# Patient Record
Sex: MALE | Race: BLACK OR AFRICAN AMERICAN | NOT HISPANIC OR LATINO | URBAN - METROPOLITAN AREA
[De-identification: names, ages, dates, MRNs, and addresses within clinical notes are randomized per-mention and may not be internally consistent; named-entity substitution may affect disease eponyms.]

---

## 2019-09-08 VITALS
RESPIRATION RATE: 18 BRPM | HEART RATE: 87 BPM | DIASTOLIC BLOOD PRESSURE: 101 MMHG | OXYGEN SATURATION: 94 % | SYSTOLIC BLOOD PRESSURE: 167 MMHG | WEIGHT: 210.98 LBS | TEMPERATURE: 99 F

## 2019-09-08 LAB
APTT BLD: 32.4 SEC — SIGNIFICANT CHANGE UP (ref 27.5–36.3)
BASOPHILS # BLD AUTO: 0.02 K/UL — SIGNIFICANT CHANGE UP (ref 0–0.2)
BASOPHILS NFR BLD AUTO: 0.3 % — SIGNIFICANT CHANGE UP (ref 0–2)
EOSINOPHIL # BLD AUTO: 0.09 K/UL — SIGNIFICANT CHANGE UP (ref 0–0.5)
EOSINOPHIL NFR BLD AUTO: 1.4 % — SIGNIFICANT CHANGE UP (ref 0–6)
HCT VFR BLD CALC: 25 % — LOW (ref 39–50)
HGB BLD-MCNC: 8 G/DL — LOW (ref 13–17)
IMM GRANULOCYTES NFR BLD AUTO: 0.3 % — SIGNIFICANT CHANGE UP (ref 0–1.5)
INR BLD: 1.46 — HIGH (ref 0.88–1.16)
LYMPHOCYTES # BLD AUTO: 0.62 K/UL — LOW (ref 1–3.3)
LYMPHOCYTES # BLD AUTO: 9.4 % — LOW (ref 13–44)
MCHC RBC-ENTMCNC: 29.2 PG — SIGNIFICANT CHANGE UP (ref 27–34)
MCHC RBC-ENTMCNC: 32 GM/DL — SIGNIFICANT CHANGE UP (ref 32–36)
MCV RBC AUTO: 91.2 FL — SIGNIFICANT CHANGE UP (ref 80–100)
MONOCYTES # BLD AUTO: 0.78 K/UL — SIGNIFICANT CHANGE UP (ref 0–0.9)
MONOCYTES NFR BLD AUTO: 11.8 % — SIGNIFICANT CHANGE UP (ref 2–14)
NEUTROPHILS # BLD AUTO: 5.1 K/UL — SIGNIFICANT CHANGE UP (ref 1.8–7.4)
NEUTROPHILS NFR BLD AUTO: 76.8 % — SIGNIFICANT CHANGE UP (ref 43–77)
NRBC # BLD: 0 /100 WBCS — SIGNIFICANT CHANGE UP (ref 0–0)
PLATELET # BLD AUTO: 219 K/UL — SIGNIFICANT CHANGE UP (ref 150–400)
PROTHROM AB SERPL-ACNC: 16.7 SEC — HIGH (ref 10–12.9)
RBC # BLD: 2.74 M/UL — LOW (ref 4.2–5.8)
RBC # FLD: 15.7 % — HIGH (ref 10.3–14.5)
WBC # BLD: 6.63 K/UL — SIGNIFICANT CHANGE UP (ref 3.8–10.5)
WBC # FLD AUTO: 6.63 K/UL — SIGNIFICANT CHANGE UP (ref 3.8–10.5)

## 2019-09-08 NOTE — ED ADULT NURSE NOTE - NSIMPLEMENTINTERV_GEN_ALL_ED
Implemented All Universal Safety Interventions:  [place] to call system. Call bell, personal items and telephone within reach. Instruct patient to call for assistance. Room bathroom lighting operational. Non-slip footwear when patient is off stretcher. Physically safe environment: no spills, clutter or unnecessary equipment. Stretcher in lowest position, wheels locked, appropriate side rails in place.

## 2019-09-08 NOTE — ED ADULT NURSE NOTE - CHPI ED NUR SYMPTOMS NEG
no nausea/no blood in stool/no chills/no hematuria/no dysuria/no fever/no diarrhea/no abdominal distension

## 2019-09-08 NOTE — ED ADULT TRIAGE NOTE - CHIEF COMPLAINT QUOTE
pt BIBA from home complaining of painful and swollen testicles since yesterday states hx of similar in the past that was related to "fluid overload" and he needed dialysis

## 2019-09-08 NOTE — ED ADULT NURSE NOTE - PMH
DM (diabetes mellitus)  type 2  HLD (hyperlipidemia)    HTN (hypertension)    Renal failure  R upper arm fistula/ HD M-W-D-F

## 2019-09-08 NOTE — ED ADULT NURSE NOTE - RESPIRATORY WDL
Breathing spontaneous and unlabored. Rales/rhonchi to lower lung lobes bilaterally with regular rhythm.

## 2019-09-08 NOTE — ED ADULT NURSE NOTE - MUSCULOSKELETAL WDL
Full range of motion of upper and lower extremities, no joint tenderness/swelling. use cane to ambulate

## 2019-09-08 NOTE — ED ADULT NURSE NOTE - OBJECTIVE STATEMENT
Pt alert and oriented X3. Pt coming from home due to testicular pain and swelling for the last day. Dialysis pt. M-T-F. last HD on Friday. Pt denies any chest pain, or SOB. nonpitting lower extremities edema.

## 2019-09-09 ENCOUNTER — INPATIENT (INPATIENT)
Facility: HOSPITAL | Age: 39
LOS: 1 days | Discharge: HOME | DRG: 682 | End: 2019-09-11
Attending: STUDENT IN AN ORGANIZED HEALTH CARE EDUCATION/TRAINING PROGRAM | Admitting: STUDENT IN AN ORGANIZED HEALTH CARE EDUCATION/TRAINING PROGRAM
Payer: COMMERCIAL

## 2019-09-09 DIAGNOSIS — E87.70 FLUID OVERLOAD, UNSPECIFIED: ICD-10-CM

## 2019-09-09 DIAGNOSIS — I10 ESSENTIAL (PRIMARY) HYPERTENSION: ICD-10-CM

## 2019-09-09 DIAGNOSIS — F20.9 SCHIZOPHRENIA, UNSPECIFIED: ICD-10-CM

## 2019-09-09 DIAGNOSIS — G92 TOXIC ENCEPHALOPATHY: ICD-10-CM

## 2019-09-09 DIAGNOSIS — Z91.89 OTHER SPECIFIED PERSONAL RISK FACTORS, NOT ELSEWHERE CLASSIFIED: ICD-10-CM

## 2019-09-09 DIAGNOSIS — N18.6 END STAGE RENAL DISEASE: ICD-10-CM

## 2019-09-09 DIAGNOSIS — E11.9 TYPE 2 DIABETES MELLITUS WITHOUT COMPLICATIONS: ICD-10-CM

## 2019-09-09 DIAGNOSIS — I63.9 CEREBRAL INFARCTION, UNSPECIFIED: ICD-10-CM

## 2019-09-09 DIAGNOSIS — I77.0 ARTERIOVENOUS FISTULA, ACQUIRED: Chronic | ICD-10-CM

## 2019-09-09 DIAGNOSIS — R63.8 OTHER SYMPTOMS AND SIGNS CONCERNING FOOD AND FLUID INTAKE: ICD-10-CM

## 2019-09-09 DIAGNOSIS — N50.89 OTHER SPECIFIED DISORDERS OF THE MALE GENITAL ORGANS: ICD-10-CM

## 2019-09-09 LAB
ALBUMIN SERPL ELPH-MCNC: 3.7 G/DL — SIGNIFICANT CHANGE UP (ref 3.3–5)
ALP SERPL-CCNC: 160 U/L — HIGH (ref 40–120)
ALT FLD-CCNC: 14 U/L — SIGNIFICANT CHANGE UP (ref 10–45)
ANION GAP SERPL CALC-SCNC: 15 MMOL/L — SIGNIFICANT CHANGE UP (ref 5–17)
ANION GAP SERPL CALC-SCNC: 17 MMOL/L — SIGNIFICANT CHANGE UP (ref 5–17)
APTT BLD: 29.9 SEC — SIGNIFICANT CHANGE UP (ref 27.5–36.3)
AST SERPL-CCNC: 16 U/L — SIGNIFICANT CHANGE UP (ref 10–40)
BILIRUB SERPL-MCNC: 0.8 MG/DL — SIGNIFICANT CHANGE UP (ref 0.2–1.2)
BUN SERPL-MCNC: 45 MG/DL — HIGH (ref 7–23)
BUN SERPL-MCNC: 48 MG/DL — HIGH (ref 7–23)
CALCIUM SERPL-MCNC: 8.7 MG/DL — SIGNIFICANT CHANGE UP (ref 8.4–10.5)
CALCIUM SERPL-MCNC: 8.8 MG/DL — SIGNIFICANT CHANGE UP (ref 8.4–10.5)
CHLORIDE SERPL-SCNC: 90 MMOL/L — LOW (ref 96–108)
CHLORIDE SERPL-SCNC: 91 MMOL/L — LOW (ref 96–108)
CO2 SERPL-SCNC: 26 MMOL/L — SIGNIFICANT CHANGE UP (ref 22–31)
CO2 SERPL-SCNC: 26 MMOL/L — SIGNIFICANT CHANGE UP (ref 22–31)
CREAT SERPL-MCNC: 5.87 MG/DL — HIGH (ref 0.5–1.3)
CREAT SERPL-MCNC: 6.16 MG/DL — HIGH (ref 0.5–1.3)
GLUCOSE BLDC GLUCOMTR-MCNC: 257 MG/DL — HIGH (ref 70–99)
GLUCOSE BLDC GLUCOMTR-MCNC: 276 MG/DL — HIGH (ref 70–99)
GLUCOSE BLDC GLUCOMTR-MCNC: 291 MG/DL — HIGH (ref 70–99)
GLUCOSE BLDC GLUCOMTR-MCNC: 312 MG/DL — HIGH (ref 70–99)
GLUCOSE BLDC GLUCOMTR-MCNC: 550 MG/DL — CRITICAL HIGH (ref 70–99)
GLUCOSE SERPL-MCNC: 355 MG/DL — HIGH (ref 70–99)
GLUCOSE SERPL-MCNC: 685 MG/DL — CRITICAL HIGH (ref 70–99)
HBA1C BLD-MCNC: 12.6 % — HIGH (ref 4–5.6)
HBV SURFACE AB SER-ACNC: SIGNIFICANT CHANGE UP
HCT VFR BLD CALC: 26.2 % — LOW (ref 39–50)
HCV AB S/CO SERPL IA: 0.05 S/CO — SIGNIFICANT CHANGE UP
HCV AB SERPL-IMP: SIGNIFICANT CHANGE UP
HGB BLD-MCNC: 8 G/DL — LOW (ref 13–17)
INR BLD: 1.38 — HIGH (ref 0.88–1.16)
MAGNESIUM SERPL-MCNC: 2.3 MG/DL — SIGNIFICANT CHANGE UP (ref 1.6–2.6)
MCHC RBC-ENTMCNC: 28.7 PG — SIGNIFICANT CHANGE UP (ref 27–34)
MCHC RBC-ENTMCNC: 30.5 GM/DL — LOW (ref 32–36)
MCV RBC AUTO: 93.9 FL — SIGNIFICANT CHANGE UP (ref 80–100)
NRBC # BLD: 0 /100 WBCS — SIGNIFICANT CHANGE UP (ref 0–0)
PHOSPHATE SERPL-MCNC: 5.1 MG/DL — HIGH (ref 2.5–4.5)
PHOSPHATE SERPL-MCNC: 6.3 MG/DL — HIGH (ref 2.5–4.5)
PLATELET # BLD AUTO: 222 K/UL — SIGNIFICANT CHANGE UP (ref 150–400)
POTASSIUM SERPL-MCNC: 4.1 MMOL/L — SIGNIFICANT CHANGE UP (ref 3.5–5.3)
POTASSIUM SERPL-MCNC: 4.8 MMOL/L — SIGNIFICANT CHANGE UP (ref 3.5–5.3)
POTASSIUM SERPL-SCNC: 4.1 MMOL/L — SIGNIFICANT CHANGE UP (ref 3.5–5.3)
POTASSIUM SERPL-SCNC: 4.8 MMOL/L — SIGNIFICANT CHANGE UP (ref 3.5–5.3)
PROT SERPL-MCNC: 6.8 G/DL — SIGNIFICANT CHANGE UP (ref 6–8.3)
PROTHROM AB SERPL-ACNC: 15.7 SEC — HIGH (ref 10–12.9)
RBC # BLD: 2.79 M/UL — LOW (ref 4.2–5.8)
RBC # FLD: 15.5 % — HIGH (ref 10.3–14.5)
SODIUM SERPL-SCNC: 131 MMOL/L — LOW (ref 135–145)
SODIUM SERPL-SCNC: 134 MMOL/L — LOW (ref 135–145)
TROPONIN T SERPL-MCNC: 0.39 NG/ML — CRITICAL HIGH (ref 0–0.01)
TROPONIN T SERPL-MCNC: 0.42 NG/ML — CRITICAL HIGH (ref 0–0.01)
TROPONIN T SERPL-MCNC: 0.43 NG/ML — CRITICAL HIGH (ref 0–0.01)
WBC # BLD: 6.49 K/UL — SIGNIFICANT CHANGE UP (ref 3.8–10.5)
WBC # FLD AUTO: 6.49 K/UL — SIGNIFICANT CHANGE UP (ref 3.8–10.5)

## 2019-09-09 PROCEDURE — 99223 1ST HOSP IP/OBS HIGH 75: CPT | Mod: GC

## 2019-09-09 PROCEDURE — 93010 ELECTROCARDIOGRAM REPORT: CPT

## 2019-09-09 PROCEDURE — 76870 US EXAM SCROTUM: CPT | Mod: 26

## 2019-09-09 PROCEDURE — 99285 EMERGENCY DEPT VISIT HI MDM: CPT | Mod: 25

## 2019-09-09 PROCEDURE — 71045 X-RAY EXAM CHEST 1 VIEW: CPT | Mod: 26

## 2019-09-09 RX ORDER — INSULIN LISPRO 100/ML
10 VIAL (ML) SUBCUTANEOUS
Qty: 0 | Refills: 0 | DISCHARGE

## 2019-09-09 RX ORDER — DEXTROSE 50 % IN WATER 50 %
25 SYRINGE (ML) INTRAVENOUS ONCE
Refills: 0 | Status: DISCONTINUED | OUTPATIENT
Start: 2019-09-09 | End: 2019-09-11

## 2019-09-09 RX ORDER — HALOPERIDOL DECANOATE 100 MG/ML
1 INJECTION INTRAMUSCULAR
Qty: 0 | Refills: 0 | DISCHARGE

## 2019-09-09 RX ORDER — CARVEDILOL PHOSPHATE 80 MG/1
12.5 CAPSULE, EXTENDED RELEASE ORAL EVERY 12 HOURS
Refills: 0 | Status: DISCONTINUED | OUTPATIENT
Start: 2019-09-09 | End: 2019-09-11

## 2019-09-09 RX ORDER — GLUCAGON INJECTION, SOLUTION 0.5 MG/.1ML
1 INJECTION, SOLUTION SUBCUTANEOUS ONCE
Refills: 0 | Status: DISCONTINUED | OUTPATIENT
Start: 2019-09-09 | End: 2019-09-11

## 2019-09-09 RX ORDER — INSULIN HUMAN 100 [IU]/ML
12 INJECTION, SOLUTION SUBCUTANEOUS ONCE
Refills: 0 | Status: COMPLETED | OUTPATIENT
Start: 2019-09-09 | End: 2019-09-09

## 2019-09-09 RX ORDER — HALOPERIDOL DECANOATE 100 MG/ML
5 INJECTION INTRAMUSCULAR DAILY
Refills: 0 | Status: DISCONTINUED | OUTPATIENT
Start: 2019-09-09 | End: 2019-09-11

## 2019-09-09 RX ORDER — BENZTROPINE MESYLATE 1 MG
1 TABLET ORAL
Qty: 0 | Refills: 0 | DISCHARGE

## 2019-09-09 RX ORDER — APIXABAN 2.5 MG/1
1 TABLET, FILM COATED ORAL
Qty: 0 | Refills: 0 | DISCHARGE

## 2019-09-09 RX ORDER — ALBUTEROL 90 UG/1
2 AEROSOL, METERED ORAL EVERY 6 HOURS
Refills: 0 | Status: DISCONTINUED | OUTPATIENT
Start: 2019-09-09 | End: 2019-09-11

## 2019-09-09 RX ORDER — LOSARTAN POTASSIUM 100 MG/1
1 TABLET, FILM COATED ORAL
Qty: 0 | Refills: 0 | DISCHARGE

## 2019-09-09 RX ORDER — LOSARTAN POTASSIUM 100 MG/1
100 TABLET, FILM COATED ORAL DAILY
Refills: 0 | Status: DISCONTINUED | OUTPATIENT
Start: 2019-09-09 | End: 2019-09-11

## 2019-09-09 RX ORDER — INSULIN LISPRO 100/ML
VIAL (ML) SUBCUTANEOUS
Refills: 0 | Status: DISCONTINUED | OUTPATIENT
Start: 2019-09-09 | End: 2019-09-09

## 2019-09-09 RX ORDER — ACETAMINOPHEN 500 MG
650 TABLET ORAL ONCE
Refills: 0 | Status: COMPLETED | OUTPATIENT
Start: 2019-09-09 | End: 2019-09-09

## 2019-09-09 RX ORDER — DEXTROSE 50 % IN WATER 50 %
12.5 SYRINGE (ML) INTRAVENOUS ONCE
Refills: 0 | Status: DISCONTINUED | OUTPATIENT
Start: 2019-09-09 | End: 2019-09-11

## 2019-09-09 RX ORDER — ASPIRIN/CALCIUM CARB/MAGNESIUM 324 MG
81 TABLET ORAL DAILY
Refills: 0 | Status: DISCONTINUED | OUTPATIENT
Start: 2019-09-09 | End: 2019-09-11

## 2019-09-09 RX ORDER — LEVETIRACETAM 250 MG/1
500 TABLET, FILM COATED ORAL
Refills: 0 | Status: DISCONTINUED | OUTPATIENT
Start: 2019-09-09 | End: 2019-09-11

## 2019-09-09 RX ORDER — SODIUM CHLORIDE 9 MG/ML
1000 INJECTION, SOLUTION INTRAVENOUS
Refills: 0 | Status: DISCONTINUED | OUTPATIENT
Start: 2019-09-09 | End: 2019-09-11

## 2019-09-09 RX ORDER — CARVEDILOL PHOSPHATE 80 MG/1
1 CAPSULE, EXTENDED RELEASE ORAL
Qty: 0 | Refills: 0 | DISCHARGE

## 2019-09-09 RX ORDER — ASPIRIN/CALCIUM CARB/MAGNESIUM 324 MG
1 TABLET ORAL
Qty: 0 | Refills: 0 | DISCHARGE

## 2019-09-09 RX ORDER — LEVETIRACETAM 250 MG/1
1 TABLET, FILM COATED ORAL
Qty: 0 | Refills: 0 | DISCHARGE

## 2019-09-09 RX ORDER — FUROSEMIDE 40 MG
80 TABLET ORAL ONCE
Refills: 0 | Status: COMPLETED | OUTPATIENT
Start: 2019-09-09 | End: 2019-09-09

## 2019-09-09 RX ORDER — INFLUENZA VIRUS VACCINE 15; 15; 15; 15 UG/.5ML; UG/.5ML; UG/.5ML; UG/.5ML
0.5 SUSPENSION INTRAMUSCULAR ONCE
Refills: 0 | Status: DISCONTINUED | OUTPATIENT
Start: 2019-09-09 | End: 2019-09-11

## 2019-09-09 RX ORDER — FUROSEMIDE 40 MG
1 TABLET ORAL
Qty: 0 | Refills: 0 | DISCHARGE

## 2019-09-09 RX ORDER — SIMVASTATIN 20 MG/1
40 TABLET, FILM COATED ORAL AT BEDTIME
Refills: 0 | Status: DISCONTINUED | OUTPATIENT
Start: 2019-09-09 | End: 2019-09-11

## 2019-09-09 RX ORDER — DEXTROSE 50 % IN WATER 50 %
15 SYRINGE (ML) INTRAVENOUS ONCE
Refills: 0 | Status: DISCONTINUED | OUTPATIENT
Start: 2019-09-09 | End: 2019-09-11

## 2019-09-09 RX ORDER — APIXABAN 2.5 MG/1
2.5 TABLET, FILM COATED ORAL
Refills: 0 | Status: DISCONTINUED | OUTPATIENT
Start: 2019-09-09 | End: 2019-09-11

## 2019-09-09 RX ORDER — INSULIN LISPRO 100/ML
VIAL (ML) SUBCUTANEOUS
Refills: 0 | Status: DISCONTINUED | OUTPATIENT
Start: 2019-09-09 | End: 2019-09-11

## 2019-09-09 RX ORDER — SENNA PLUS 8.6 MG/1
1 TABLET ORAL
Qty: 0 | Refills: 0 | DISCHARGE

## 2019-09-09 RX ORDER — SIMVASTATIN 20 MG/1
1 TABLET, FILM COATED ORAL
Qty: 0 | Refills: 0 | DISCHARGE

## 2019-09-09 RX ORDER — BENZTROPINE MESYLATE 1 MG
0.5 TABLET ORAL AT BEDTIME
Refills: 0 | Status: DISCONTINUED | OUTPATIENT
Start: 2019-09-09 | End: 2019-09-11

## 2019-09-09 RX ORDER — FUROSEMIDE 40 MG
80 TABLET ORAL
Refills: 0 | Status: DISCONTINUED | OUTPATIENT
Start: 2019-09-10 | End: 2019-09-11

## 2019-09-09 RX ORDER — FAMOTIDINE 10 MG/ML
1 INJECTION INTRAVENOUS
Qty: 0 | Refills: 0 | DISCHARGE

## 2019-09-09 RX ORDER — INSULIN GLARGINE 100 [IU]/ML
20 INJECTION, SOLUTION SUBCUTANEOUS
Qty: 0 | Refills: 0 | DISCHARGE

## 2019-09-09 RX ORDER — ALBUTEROL 90 UG/1
2 AEROSOL, METERED ORAL
Qty: 0 | Refills: 0 | DISCHARGE

## 2019-09-09 RX ADMIN — SIMVASTATIN 40 MILLIGRAM(S): 20 TABLET, FILM COATED ORAL at 22:21

## 2019-09-09 RX ADMIN — Medication 6: at 23:14

## 2019-09-09 RX ADMIN — HALOPERIDOL DECANOATE 5 MILLIGRAM(S): 100 INJECTION INTRAMUSCULAR at 12:39

## 2019-09-09 RX ADMIN — Medication 81 MILLIGRAM(S): at 12:39

## 2019-09-09 RX ADMIN — Medication 8: at 12:39

## 2019-09-09 RX ADMIN — LEVETIRACETAM 500 MILLIGRAM(S): 250 TABLET, FILM COATED ORAL at 06:18

## 2019-09-09 RX ADMIN — CARVEDILOL PHOSPHATE 12.5 MILLIGRAM(S): 80 CAPSULE, EXTENDED RELEASE ORAL at 06:18

## 2019-09-09 RX ADMIN — Medication 6: at 07:09

## 2019-09-09 RX ADMIN — CARVEDILOL PHOSPHATE 12.5 MILLIGRAM(S): 80 CAPSULE, EXTENDED RELEASE ORAL at 18:52

## 2019-09-09 RX ADMIN — LOSARTAN POTASSIUM 100 MILLIGRAM(S): 100 TABLET, FILM COATED ORAL at 06:18

## 2019-09-09 RX ADMIN — Medication 80 MILLIGRAM(S): at 02:04

## 2019-09-09 RX ADMIN — Medication 6: at 18:51

## 2019-09-09 RX ADMIN — INSULIN HUMAN 12 UNIT(S): 100 INJECTION, SOLUTION SUBCUTANEOUS at 02:03

## 2019-09-09 RX ADMIN — INSULIN HUMAN 12 UNIT(S): 100 INJECTION, SOLUTION SUBCUTANEOUS at 03:09

## 2019-09-09 RX ADMIN — APIXABAN 2.5 MILLIGRAM(S): 2.5 TABLET, FILM COATED ORAL at 22:21

## 2019-09-09 RX ADMIN — Medication 650 MILLIGRAM(S): at 23:15

## 2019-09-09 RX ADMIN — LEVETIRACETAM 500 MILLIGRAM(S): 250 TABLET, FILM COATED ORAL at 18:51

## 2019-09-09 RX ADMIN — INSULIN HUMAN 12 UNIT(S): 100 INJECTION, SOLUTION SUBCUTANEOUS at 00:25

## 2019-09-09 RX ADMIN — Medication 0.5 MILLIGRAM(S): at 22:21

## 2019-09-09 RX ADMIN — APIXABAN 2.5 MILLIGRAM(S): 2.5 TABLET, FILM COATED ORAL at 06:27

## 2019-09-09 NOTE — DISCHARGE NOTE PROVIDER - CARE PROVIDER_API CALL
[facility], [facility]  [address], [place] [address]  Phone: [phone]  Fax: (   )    -  Follow Up Time:

## 2019-09-09 NOTE — H&P ADULT - HISTORY OF PRESENT ILLNESS
Patient is a 40 yo M, poor historian, w/ PMHx of HTN, HLD, CAD s/p stents, ESRD (M,W,F), history of CVA (w/ left facial droop, left sided weakness) and schizophrenia who presents with LE, abdominal and scrotal swelling. The patient reports that two days ago he developed painfully swollen legs, abdomen, scrotum and penis. He also endorses burning of his scrotum and penis that started yesterday with the swelling. He reports his legs were slightly swollen before this but states the rest of the swelling started suddenly. He endorses no acute incidents on Friday, and says he went to dialysis at [facility] as scheduled. He states he missed a dialysis session recently but is unable to tell when. He does not endorse any CP, SOB or WYLIE. He reports he takes his medication everyday, but states he only takes his insulin when his FSG is high, but doesn't always check his FSG. Otherwise denies fever, chills, lightheadedness, cough, N/V/D/C, abdominal pain, dysuria, hematuria. He states that despite his renal failure he does make a small amount of urine.     In ED, vitals were T98.6F, HR 87, /101, RR 18, O2 sat 94% on RA. Labs s/f Hgb 8.0, INR 1.4, Na 131, Cl 90, BUN/Cr 45/5.87, , , Trop 0.39. EKG showed NSR w/o ST segment changes. CXR showed vascular congestion. Scrotal US showed scrotal edema and moderate left variocele. The patient was given Lasix 80mg IV x1, Insulin 12U x3 and was admitted to [facility] for further management. Patient is a 38 yo M, poor historian, w/ PMHx of HTN, HLD, CAD s/p stents, ESRD (M,W,F), history of CVA (w/ left facial droop, left sided weakness), seizures and schizophrenia who presents with LE, abdominal and scrotal swelling. The patient reports that two days ago he developed painfully swollen legs, abdomen, scrotum and penis. He also endorses burning of his scrotum and penis that started yesterday with the swelling. He reports his legs were slightly swollen before this but states the rest of the swelling started suddenly. He endorses no acute incidents on Friday, and says he went to dialysis at [facility] as scheduled. He states he missed a dialysis session recently but is unable to tell when. He does not endorse any CP, SOB or WYLIE. He reports he takes his medication everyday, but states he only takes his insulin when his FSG is high, but doesn't always check his FSG. Otherwise denies fever, chills, lightheadedness, cough, N/V/D/C, abdominal pain, dysuria, hematuria. He states that despite his renal failure he does make a small amount of urine.     In ED, vitals were T98.6F, HR 87, /101, RR 18, O2 sat 94% on RA. Labs s/f Hgb 8.0, INR 1.4, Na 131, Cl 90, BUN/Cr 45/5.87, , , Trop 0.39. EKG showed NSR w/o ST segment changes. CXR showed vascular congestion. Scrotal US showed scrotal edema and moderate left variocele. The patient was given Lasix 80mg IV x1, Insulin 12U x3 and was admitted to [facility] for further management.

## 2019-09-09 NOTE — H&P ADULT - ASSESSMENT
Patient is a 38 yo M, poor historian, w/ PMHx of HTN, HLD, CAD s/p stents, ESRD (M,W,F), history of CVA (w/ left facial droop, left sided weakness) and schizophrenia who presents with scrotal pain and swelling, likely fluid overload in setting of ESRD. Patient is a 40 yo M, poor historian, w/ PMHx of HTN, HLD, CAD s/p stents, ESRD (M,W,F), history of CVA (w/ left facial droop, left sided weakness), seizures and schizophrenia who presents with scrotal pain and swelling, likely fluid overload in setting of ESRD.

## 2019-09-09 NOTE — H&P ADULT - ATTENDING COMMENTS
patient seen and examined  reviewed pertinent data, h&p  agree w/  PE findings as above , except pt w/ left sided hemiparesis; awake, alert, answers questions though groggy;  +scrotal edema as above; pt w/ dry lower ext b/l , in addition to edema        1. fluid oveload: unclear last session of HD,  pt reports to me going to Friday session;  pending HD, followup renal recs   2. scrotal edema: monitor for improvement s/p HD, consult urology if worsening, check STI screen including HIV  3. trend troponins  4. DM: uncontrolled, agree w/ endo consult.     rest of plan as above   medical decision making : high complexity

## 2019-09-09 NOTE — H&P ADULT - PROBLEM SELECTOR PLAN 6
Patient w/ history of CVA w/ left sided weakness and left facial droop.   -c/w aspirin and statin Patient w/ history of CVA w/ left sided weakness and left facial droop.   -c/w aspirin and statin    #Anemia  Hgb of 8.0, no signs of active bleeding, denies hematuria, melena and BRBPR. Likely 2/2 ESRD.  - trend CBC  - maintain active T&S  - transfuse for Hgb <7

## 2019-09-09 NOTE — DISCHARGE NOTE PROVIDER - NSDCFUADDAPPT_GEN_ALL_CORE_FT
[facility], [place]  [address], [place] [address]  Phone: [phone]  Fax: (   )    -  Follow Up Time: Tomorrow at your regular scheduled time

## 2019-09-09 NOTE — CONSULT NOTE ADULT - ASSESSMENT
A/p  40 y/o AAM w/PMHx of chronic vasculopathy likely in the setting prolonged uncontrolled DM and HTN cb CAD/ CVA/ ESRD on HD presenting to ED for worsening of scrotal pain and swelling over the weekend. nephrology consulted to set up for HD.

## 2019-09-09 NOTE — H&P ADULT - NSHPPHYSICALEXAM_GEN_ALL_CORE
Vital Signs Last 12 Hrs  T(F): 98 (09-09-19 @ 03:16), Max: 98.6 (09-08-19 @ 23:07)  HR: 80 (09-09-19 @ 03:16) (80 - 87)  BP: 165/84 (09-09-19 @ 03:16) (165/84 - 167/101)  BP(mean): --  RR: 18 (09-09-19 @ 03:16) (18 - 18)  SpO2: 98% (09-09-19 @ 03:16) (94% - 98%)    PHYSICAL EXAM:  Constitutional: Lethargic but arousable, NAD, comfortable in bed.  HEENT: NC/AT, PERRLA, EOMI, no conjunctival pallor, MMM  Neck: Supple  Respiratory: Normal rate, rhythm, depth, effort. Bibasilar rales   Cardiovascular: RRR, normal S1 and S2, no m/r/g.   Gastrointestinal: +BS, soft NTTP, Distended, no guarding or rebound tenderness, no palpable masses  Genital: + scrotal and penile edema, no rashes or ulcerations noted, no discharge from tip of penis   Extremities: wwp; no cyanosis or clubbing, b/l LE 2+ edema. RUE w/ AVF.   Vascular: Pulses equal and strong throughout.   Neurological: AAOx3, left sided facial droop with dysarthria, non-compliant with strength exam   Skin: venous stasis changes of b/l LE

## 2019-09-09 NOTE — DISCHARGE NOTE PROVIDER - HOSPITAL COURSE
Patient is a 38 yo M, poor historian, w/ PMHx of HTN, HLD, CAD s/p stents, ESRD (M,W,F), history of CVA (w/ left facial droop, left sided weakness), seizures and schizophrenia who presents with LE, abdominal and scrotal swelling.        #Scrotal swelling 2/2 volume overload 2/2 CKD    Course: Admitted with scrotal swelling. Ordered scrotal support. Ultrasound revealed scrotal edema. Likely d/t volume overload, either in setting of ? missed HD sessions vs underremoval of fluid. Patient dialyzed, 5L UF, volume overload and swelling improved, HD stable, d/c home.     Plan: Continued nephrology follow up and hemodialysis sessions, patient has regular follow up with own dialysis clinic at [facility]. Patient is a 40 yo M, poor historian, w/ PMHx of HTN, HLD, CAD s/p stents, ESRD (M,W,F), history of CVA (w/ left facial droop, left sided weakness), seizures and schizophrenia who presents with LE, abdominal and scrotal swelling.        #Scrotal swelling 2/2 volume overload 2/2 CKD    Course: Admitted with scrotal swelling. Ordered scrotal support. Ultrasound revealed scrotal edema. Likely d/t volume overload, either in setting of ? missed HD sessions vs underremoval of fluid. Patient dialyzed, 5L UF, volume overload and swelling improved, HD stable, d/c home.    Plan: Continued nephrology follow up and hemodialysis sessions, patient has regular follow up with own dialysis clinic at [place]. Patient is a 38 yo M, poor historian, w/ PMHx of HTN, HLD, CAD s/p stents, ESRD (M,W,F), history of CVA (w/ left facial droop, left sided weakness), seizures and schizophrenia who presents with LE, abdominal and scrotal swelling.        #Scrotal swelling 2/2 volume overload 2/2 CKD    Course: Admitted with scrotal swelling. Ordered scrotal support. Ultrasound revealed scrotal edema. Likely d/t volume overload, either in setting of cocaine use (+utox) vs missed HD sessions. Patient dialyzed, 5L UF, volume overload and swelling improved, HD stable, d/c home.    Plan: Continued nephrology follow up and hemodialysis sessions, patient has regular follow up with own dialysis clinic at [place].

## 2019-09-09 NOTE — H&P ADULT - PROBLEM SELECTOR PLAN 2
Patient with scrotal and penile pain, likely 2/2 edema from fluid overload as above.  -treatment of fluid overload as above Patient with scrotal and penile pain, likely 2/2 edema from fluid overload as above.  -treatment of fluid overload as above    #troponemia  Patient w/ elevated trop t. Denies CP, EKG w/o ST segment changes. Likely 2/2 CAD in setting of ESRD.  -f/u AM troponin, if increases significantly consult cards

## 2019-09-09 NOTE — CONSULT NOTE ADULT - PROBLEM SELECTOR RECOMMENDATION 9
on HD via AVG/ follows at [facility]/ does not know his dry weight/ but reports average 4-5 L of UF each session  - plan for HD with 4-5 L of UF today  normocytic anemia Hg ~ 8 will obtain records from HD center to see if he is on WALESKA/ will hold off on it today in the setting of known stroke   -please obtain iron studies   for CKD-MBD will f/u outpt levels of Vit D/ PTH from records     Will follow  discussed with Dr. Dunbar

## 2019-09-09 NOTE — H&P ADULT - NSICDXFAMILYHX_GEN_ALL_CORE_FT
No pertinent family history in first degree relatives FAMILY HISTORY:  FH: diabetes mellitus, mother  FHx: brain aneurysm, father, passed

## 2019-09-09 NOTE — CONSULT NOTE ADULT - SUBJECTIVE AND OBJECTIVE BOX
HPI:  38 yo M, poor historian, w/ PMHx of HTN, HLD, CAD s/p stents, ESRD (M,W,F), history of CVA (w/ left facial droop, left sided weakness), seizures and schizophrenia who presented to ED with LE, abdominal and scrotal swelling/nephrology called overnight for consideration of urgent HD/ labs/VS and imaging reviewed overnight/ NAD/ sating 99% on RA/ lungs not sig overloaded on XRay/ K 4.7 without acidosis or clinical concern for uremia per ED staff/ deferred HD for later today/ seen and examined in ED, he look comfortable/ denies CP/SOB or palpitation his main concern in scrotal swelling/ reports staying on his feet for a long time on Friday night after HD session( works as a security) reports similar problem in the past however never at this extent usually with improvement after fluid removal post HD/ follows at [facility]/ last HD on friday/ on exam his lungs are clear/ however has significant anasarca up to the abdomen with remarkable scrotal swelling/ US shows scrotal edema with mild left varicocele without evidences of epididymitis.       Review of Systems:  Other Review of Systems: as noted in HPI	      Allergies and Intolerances:        Allergies:  	Depakote: Drug, Nausea    Home Medications:   * Patient Currently Takes Medications as of 09-Sep-2019 03:53 documented in Structured Notes  · 	simvastatin 40 mg oral tablet: Last Dose Taken:  , 1 tab(s) orally once a day (at bedtime)  · 	Aspirin Enteric Coated 81 mg oral delayed release tablet: Last Dose Taken:  , 1 tab(s) orally once a day  · 	Keppra 500 mg oral tablet: Last Dose Taken:  , 1 tab(s) orally 2 times a day  · 	Eliquis 2.5 mg oral tablet: Last Dose Taken:  , 1 tab(s) orally 2 times a day  · 	famotidine 20 mg oral tablet: Last Dose Taken:  , 1 tab(s) orally once a day  · 	HumaLOG 100 units/mL subcutaneous solution: Last Dose Taken:  , 10 unit(s) subcutaneous 3 times a day (before meals)  · 	Basaglar KwikPen 100 units/mL subcutaneous solution: Last Dose Taken:  , 20 unit(s) subcutaneous once a day (at bedtime)  · 	Lasix 80 mg oral tablet: Last Dose Taken:  , 1 tab(s) orally 2 times a day  · 	albuterol 90 mcg/inh inhalation aerosol: Last Dose Taken:  , 2 puff(s) inhaled 4 times a day  · 	Haldol 5 mg oral tablet: Last Dose Taken:  , 1 tab(s) orally once a day  · 	Cogentin 0.5 mg oral tablet: Last Dose Taken:  , 1 tab(s) orally once a day (at bedtime)  · 	Senna 8.6 mg oral tablet: Last Dose Taken:  , 1 tab(s) orally once a day (at bedtime)  · 	losartan 100 mg oral tablet: Last Dose Taken:  , 1 tab(s) orally once a day  · 	Coreg 12.5 mg oral tablet: Last Dose Taken:  , 1 tab(s) orally 2 times a day    .    Patient History:    Past Medical, Past Surgical, and Family History:  PAST MEDICAL HISTORY:  CAD (coronary artery disease)   Cerebrovascular accident (CVA)   DM (diabetes mellitus) type 2  HLD (hyperlipidemia)   HTN (hypertension)   Renal failure R upper arm fistula/ HD M-W-D-F  Schizophrenia.  PAST SURGICAL HISTORY:  Arteriovenous fistula.    FAMILY HISTORY:  DM and brain aneurysm/ no sig Fhx of kidney disease      Social History:  Social History (marital status, living situation, occupation, tobacco use, alcohol and drug use, and sexual history): daily active smoker/ no alcohol or recreational drug use.	    .  VITAL SIGNS:  T(C): 37 (09-09-19 @ 13:30), Max: 37 (09-08-19 @ 23:07)  T(F): 98.6 (09-09-19 @ 13:30), Max: 98.6 (09-08-19 @ 23:07)  HR: 74 (09-09-19 @ 13:30) (73 - 87)  BP: 151/89 (09-09-19 @ 13:30) (141/83 - 167/101)  RR: 18 (09-09-19 @ 13:30) (16 - 18)  SpO2: 96% (09-09-19 @ 13:30) (93% - 98%)    PHYSICAL EXAM:  Constitutional: NAD  Eyes: PERRL  ENT: MMM  Neck: supple  Respiratory: CTA B/L  Cardiac: +S1/S2; RRR; no M/R/G  Gastrointestinal: soft, NT/ND/ pitting edema present on the lower abdomen   Genitourinary: scrotal swelling present  Extremities: WWP, sig pitting edema up to low abdomen  Neurologic: AAOx3; left sided hemiparesis 4/5 with left facial droop present/ right side forces 5/5  Psychiatric: pleasant and cooperative  Access: AVG c/d/i with bruit and palpable thrill    .  LABS:                         8.0    6.49  )-----------( 222      ( 09 Sep 2019 05:58 )             26.2     09-09    134<L>  |  91<L>  |  48<H>  ----------------------------<  355<H>  4.1   |  26  |  6.16<H>    Ca    8.8      09 Sep 2019 05:58  Phos  5.1     09-09  Mg     2.3     09-09    TPro  6.8  /  Alb  3.7  /  TBili  0.8  /  DBili  x   /  AST  16  /  ALT  14  /  AlkPhos  160<H>  09-08    PT/INR - ( 09 Sep 2019 05:58 )   PT: 15.7 sec;   INR: 1.38          PTT - ( 09 Sep 2019 05:58 )  PTT:29.9 sec    CARDIAC MARKERS ( 09 Sep 2019 13:11 )  x     / 0.42 ng/mL / x     / x     / x      CARDIAC MARKERS ( 09 Sep 2019 05:58 )  x     / 0.43 ng/mL / x     / x     / x      CARDIAC MARKERS ( 08 Sep 2019 23:29 )  x     / 0.39 ng/mL / x     / x     / x      RADIOLOGY, EKG & ADDITIONAL TESTS: Reviewed.

## 2019-09-09 NOTE — ED PROVIDER NOTE - OBJECTIVE STATEMENT
40 y/o m hx HTN, DM, HLD, ESRD on HD (m/w/f) presents c/o lower ext edema, swelling to scrotum with pain, abd distention and shortness of breath.  Pt stating he was dialyzed on friday, sx started over the past 2 days.  Denies fever, chills, n/v, all other ROS negative.

## 2019-09-09 NOTE — H&P ADULT - PROBLEM SELECTOR PLAN 4
Patient reports being on Glargine 22U per nocte and lispro 10U premeal. He reports poor compliance and only using insulin intermittently.  -FSG elevated to 500's  -s/p 12U lispro x3 in ED  -ISS, Endo consult in AM    #CAD  -c/w asa and atorvastatin Patient reports being on Glargine 22U per nocte and lispro 10U premeal. He reports poor compliance and only using insulin intermittently.  -FSG elevated to 500's  -s/p 12U lispro x3 in ED  -ISS, Endo consult in AM    #CAD  -c/w asa and atorvastatin  #troponemia  Patient w/ elevated trop t. Denies CP, EKG w/o ST segment changes. Likely 2/2 CAD in setting of ESRD.  -f/u AM troponin, if increases significantly consult cards

## 2019-09-09 NOTE — H&P ADULT - NSHPLABSRESULTS_GEN_ALL_CORE
LABS:                        8.0    6.63  )-----------( 219      ( 08 Sep 2019 23:29 )             25.0     09-08    131<L>  |  90<L>  |  45<H>  ----------------------------<  685<HH>  4.8   |  26  |  5.87<H>    Ca    8.7      08 Sep 2019 23:29    TPro  6.8  /  Alb  3.7  /  TBili  0.8  /  DBili  x   /  AST  16  /  ALT  14  /  AlkPhos  160<H>  09-08  PT/INR - ( 08 Sep 2019 23:29 )   PT: 16.7 sec;   INR: 1.46     PTT - ( 08 Sep 2019 23:29 )  PTT:32.4 sec    RADIOLOGY & ADDITIONAL TESTS: Reviewed.

## 2019-09-09 NOTE — H&P ADULT - NSICDXPASTMEDICALHX_GEN_ALL_CORE_FT
PAST MEDICAL HISTORY:  CAD (coronary artery disease)     Cerebrovascular accident (CVA)     DM (diabetes mellitus) type 2    HLD (hyperlipidemia)     HTN (hypertension)     Renal failure R upper arm fistula/ HD M-W-D-F    Schizophrenia

## 2019-09-09 NOTE — DISCHARGE NOTE PROVIDER - PROVIDER TOKENS
FREE:[LAST:[Dialysis Center],FIRST:[[place]],PHONE:[phone]],FAX:[(   )    -],ADDRESS:[[address] [place] [address]]]

## 2019-09-09 NOTE — ED PROVIDER NOTE - ATTENDING CONTRIBUTION TO CARE
38yo M hx of ESRD on HD M/W/F, here w/ increasing scrotal edema and pain since yesterday, also in legs/belly/penis. +SOB but not particularly worse than usual. on exam he is very fluid overloaded in legs, penis, scrotum, also tenderness to testicles. will check US testicles, CXR to r/o pulm edema. if neg - will dc to get dialysis tomorrow as already planned.

## 2019-09-09 NOTE — H&P ADULT - PROBLEM SELECTOR PLAN 1
Patient w/ diffuse fluid overload, 2+ edema of b/l LE to thighs, scrotal/penile swelling, abdominal distension. Reports last dialysis was on Friday 9/6 but states he missed one recently but cannot report exactly when. Denies any recent CP, SOB or WYLIE and EKG w/ NSR which makes symptoms less likely 2/2 ACS or PE. May be 2/2 missed dialysis sessions as patient is poor historian. Troponin elevated but may be 2/2 CAD in setting of ESRD.   -s/p Lasix 80 IV in ED w/ some urine output   -f/u trop  -Dialysis in AM

## 2019-09-09 NOTE — H&P ADULT - NSHPSOCIALHISTORY_GEN_ALL_CORE
Denies tobacco, alcohol and recreational drug use. Denies tobacco, alcohol and recreational drug use.    reports being , living w/ mother and relative; reports being sexually active w/ 1 female in the last year

## 2019-09-09 NOTE — PROGRESS NOTE ADULT - SUBJECTIVE AND OBJECTIVE BOX
Patient was seen and evaluated on dialysis.   HR: 74 (09-09-19 @ 13:30)  BP: 151/89 (09-09-19 @ 13:30)  Wt(kg): pre-hd 95.7 kg  09-09    134<L>  |  91<L>  |  48<H>  ----------------------------<  355<H>  4.1   |  26  |  6.16<H>    Ca    8.8      09 Sep 2019 05:58  Phos  5.1     09-09  Mg     2.3     09-09    TPro  6.8  /  Alb  3.7  /  TBili  0.8  /  DBili  x   /  AST  16  /  ALT  14  /  AlkPhos  160<H>  09-08    Continue dialysis:   Dialyzer: Optiflux 180        QB: 400 ml/min  K bath: 2  Goal UF: 4 L  Duration: 210 min    Patient is tolerating the procedure well.   Continue full hemodialysis treatment as prescribed. Patient was seen and evaluated on dialysis.   HR: 74 (09-09-19 @ 13:30)  BP: 151/89 (09-09-19 @ 13:30)  Wt(kg): pre-hd 95.7 kg  09-09    134<L>  |  91<L>  |  48<H>  ----------------------------<  355<H>  4.1   |  26  |  6.16<H>    Ca    8.8      09 Sep 2019 05:58  Phos  5.1     09-09  Mg     2.3     09-09    TPro  6.8  /  Alb  3.7  /  TBili  0.8  /  DBili  x   /  AST  16  /  ALT  14  /  AlkPhos  160<H>  09-08    Continue dialysis:   Dialyzer: Optiflux 180        QB: 400 ml/min  K bath: 2  Goal UF: 4 L  Duration: 225 min    Patient is tolerating the procedure well.   Continue full hemodialysis treatment as prescribed.

## 2019-09-09 NOTE — ED PROVIDER NOTE - CLINICAL SUMMARY MEDICAL DECISION MAKING FREE TEXT BOX
38 y/o m hx HTN, DM, HLD, ESRD on HD (m/w/f) presents c/o diffuse swelling to both legs, scrotum, penis abdomen, sob over the past 2 days.  Pt afebrile, O2 sat 94% on RA, grossly edematous throughout legs, scrotum, penis and abdomen, cxr shows pulmonary congestion.  Labs noted for glucose 685 with no anion gap, given 12 units insulin, will repeat fingerstick.  Discussed with renal fellow, will admit and plan for dialysis in AM.  Low concern for testicular torsion, will get u/s testicles to ensure normal flow.

## 2019-09-09 NOTE — H&P ADULT - PROBLEM SELECTOR PLAN 3
Patient lethargic but arousable and answering questions appropriately. Less likely from uremia as patient is ESRD and BUN is 47. Patient denies alcohol or drug use.  -will send Utox  -monitor mental status for improvement after dialysis    #Seizures  Patient reports history of seizures. Denies any recent seizures.  -c/w keppra 500mg BID

## 2019-09-09 NOTE — DISCHARGE NOTE PROVIDER - NSDCCPCAREPLAN_GEN_ALL_CORE_FT
PRINCIPAL DISCHARGE DIAGNOSIS  Diagnosis: Fluid overload  Assessment and Plan of Treatment: You were admitted with fluid overload. This is fluid that is commonly removed during dialysis. You stated you attended all your regular dialysis sessions so it is possible that they have not been removing sufficient fluid from your body, however you also stated you had increased fluid intake within the last few days. You should decrease your fluid intake at home and attend your regular dialysis sessions as scheduled. You should continue your regular follow up with outpatient nephrology. No infections or other acute conditions of the scrotum were noted. PRINCIPAL DISCHARGE DIAGNOSIS  Diagnosis: Fluid overload  Assessment and Plan of Treatment: You were admitted with fluid overload. This is fluid that is commonly removed during dialysis. You stated you attended all your regular dialysis sessions so it is possible that they have not been removing sufficient fluid from your body, however you also stated you had increased fluid intake within the last few days. You should decrease your fluid intake at home and attend your regular dialysis sessions as scheduled. You should continue your regular follow up with outpatient nephrology. No infections or other acute conditions of the scrotum were noted. You should follow up with your regular dialysis session scheduled for tomorrow. PRINCIPAL DISCHARGE DIAGNOSIS  Diagnosis: Fluid overload  Assessment and Plan of Treatment: You were admitted with fluid overload. This is fluid that is commonly removed during dialysis. You stated you attended all your regular dialysis sessions so it is possible that they have not been removing sufficient fluid from your body, however you also stated you had increased fluid intake within the last few days. You should decrease your fluid intake at home and attend your regular dialysis sessions as scheduled. You were also found to have cocaine in your system. Cocaine can cause your blood pressure to go up and for you to have water retention. Cocaine can also cause heart attack, stroke, liver and kidney failure. Please refrain from cocaine or any other drug use. You should continue your regular follow up with outpatient nephrology. No infections or other acute conditions of the scrotum were noted. You should follow up with your regular dialysis session scheduled for tomorrow.

## 2019-09-10 DIAGNOSIS — I10 ESSENTIAL (PRIMARY) HYPERTENSION: ICD-10-CM

## 2019-09-10 DIAGNOSIS — I25.10 ATHEROSCLEROTIC HEART DISEASE OF NATIVE CORONARY ARTERY WITHOUT ANGINA PECTORIS: ICD-10-CM

## 2019-09-10 DIAGNOSIS — F20.9 SCHIZOPHRENIA, UNSPECIFIED: ICD-10-CM

## 2019-09-10 DIAGNOSIS — I69.30 UNSPECIFIED SEQUELAE OF CEREBRAL INFARCTION: ICD-10-CM

## 2019-09-10 DIAGNOSIS — E11.65 TYPE 2 DIABETES MELLITUS WITH HYPERGLYCEMIA: ICD-10-CM

## 2019-09-10 DIAGNOSIS — E87.79 OTHER FLUID OVERLOAD: ICD-10-CM

## 2019-09-10 DIAGNOSIS — R74.8 ABNORMAL LEVELS OF OTHER SERUM ENZYMES: ICD-10-CM

## 2019-09-10 DIAGNOSIS — F14.10 COCAINE ABUSE, UNCOMPLICATED: ICD-10-CM

## 2019-09-10 LAB
ANION GAP SERPL CALC-SCNC: 15 MMOL/L — SIGNIFICANT CHANGE UP (ref 5–17)
BUN SERPL-MCNC: 35 MG/DL — HIGH (ref 7–23)
CALCIUM SERPL-MCNC: 8.8 MG/DL — SIGNIFICANT CHANGE UP (ref 8.4–10.5)
CHLORIDE SERPL-SCNC: 92 MMOL/L — LOW (ref 96–108)
CO2 SERPL-SCNC: 27 MMOL/L — SIGNIFICANT CHANGE UP (ref 22–31)
CREAT SERPL-MCNC: 5.07 MG/DL — HIGH (ref 0.5–1.3)
GLUCOSE BLDC GLUCOMTR-MCNC: 180 MG/DL — HIGH (ref 70–99)
GLUCOSE BLDC GLUCOMTR-MCNC: 204 MG/DL — HIGH (ref 70–99)
GLUCOSE BLDC GLUCOMTR-MCNC: 314 MG/DL — HIGH (ref 70–99)
GLUCOSE BLDC GLUCOMTR-MCNC: 327 MG/DL — HIGH (ref 70–99)
GLUCOSE SERPL-MCNC: 378 MG/DL — HIGH (ref 70–99)
HCT VFR BLD CALC: 27.5 % — LOW (ref 39–50)
HGB BLD-MCNC: 8.6 G/DL — LOW (ref 13–17)
MAGNESIUM SERPL-MCNC: 2 MG/DL — SIGNIFICANT CHANGE UP (ref 1.6–2.6)
MCHC RBC-ENTMCNC: 29.4 PG — SIGNIFICANT CHANGE UP (ref 27–34)
MCHC RBC-ENTMCNC: 31.3 GM/DL — LOW (ref 32–36)
MCV RBC AUTO: 93.9 FL — SIGNIFICANT CHANGE UP (ref 80–100)
NRBC # BLD: 0 /100 WBCS — SIGNIFICANT CHANGE UP (ref 0–0)
PCP SPEC-MCNC: SIGNIFICANT CHANGE UP
PHOSPHATE SERPL-MCNC: 4.6 MG/DL — HIGH (ref 2.5–4.5)
PLATELET # BLD AUTO: 216 K/UL — SIGNIFICANT CHANGE UP (ref 150–400)
POTASSIUM SERPL-MCNC: 4.4 MMOL/L — SIGNIFICANT CHANGE UP (ref 3.5–5.3)
POTASSIUM SERPL-SCNC: 4.4 MMOL/L — SIGNIFICANT CHANGE UP (ref 3.5–5.3)
RBC # BLD: 2.93 M/UL — LOW (ref 4.2–5.8)
RBC # FLD: 15.6 % — HIGH (ref 10.3–14.5)
SODIUM SERPL-SCNC: 134 MMOL/L — LOW (ref 135–145)
WBC # BLD: 6.42 K/UL — SIGNIFICANT CHANGE UP (ref 3.8–10.5)
WBC # FLD AUTO: 6.42 K/UL — SIGNIFICANT CHANGE UP (ref 3.8–10.5)

## 2019-09-10 PROCEDURE — 93306 TTE W/DOPPLER COMPLETE: CPT | Mod: 26

## 2019-09-10 PROCEDURE — 99239 HOSP IP/OBS DSCHRG MGMT >30: CPT

## 2019-09-10 RX ORDER — INSULIN GLARGINE 100 [IU]/ML
20 INJECTION, SOLUTION SUBCUTANEOUS AT BEDTIME
Refills: 0 | Status: DISCONTINUED | OUTPATIENT
Start: 2019-09-10 | End: 2019-09-11

## 2019-09-10 RX ORDER — INSULIN LISPRO 100/ML
10 VIAL (ML) SUBCUTANEOUS
Refills: 0 | Status: DISCONTINUED | OUTPATIENT
Start: 2019-09-10 | End: 2019-09-11

## 2019-09-10 RX ORDER — ACETAMINOPHEN 500 MG
650 TABLET ORAL ONCE
Refills: 0 | Status: COMPLETED | OUTPATIENT
Start: 2019-09-10 | End: 2019-09-10

## 2019-09-10 RX ADMIN — INSULIN GLARGINE 20 UNIT(S): 100 INJECTION, SOLUTION SUBCUTANEOUS at 22:06

## 2019-09-10 RX ADMIN — Medication 8: at 07:45

## 2019-09-10 RX ADMIN — LOSARTAN POTASSIUM 100 MILLIGRAM(S): 100 TABLET, FILM COATED ORAL at 06:29

## 2019-09-10 RX ADMIN — SIMVASTATIN 40 MILLIGRAM(S): 20 TABLET, FILM COATED ORAL at 22:07

## 2019-09-10 RX ADMIN — Medication 80 MILLIGRAM(S): at 06:28

## 2019-09-10 RX ADMIN — APIXABAN 2.5 MILLIGRAM(S): 2.5 TABLET, FILM COATED ORAL at 11:26

## 2019-09-10 RX ADMIN — Medication 0.5 MILLIGRAM(S): at 22:07

## 2019-09-10 RX ADMIN — Medication 81 MILLIGRAM(S): at 11:26

## 2019-09-10 RX ADMIN — LEVETIRACETAM 500 MILLIGRAM(S): 250 TABLET, FILM COATED ORAL at 07:05

## 2019-09-10 RX ADMIN — APIXABAN 2.5 MILLIGRAM(S): 2.5 TABLET, FILM COATED ORAL at 22:07

## 2019-09-10 RX ADMIN — Medication 2: at 22:05

## 2019-09-10 RX ADMIN — CARVEDILOL PHOSPHATE 12.5 MILLIGRAM(S): 80 CAPSULE, EXTENDED RELEASE ORAL at 06:28

## 2019-09-10 RX ADMIN — Medication 10 UNIT(S): at 17:22

## 2019-09-10 RX ADMIN — Medication 80 MILLIGRAM(S): at 17:22

## 2019-09-10 RX ADMIN — CARVEDILOL PHOSPHATE 12.5 MILLIGRAM(S): 80 CAPSULE, EXTENDED RELEASE ORAL at 17:22

## 2019-09-10 RX ADMIN — HALOPERIDOL DECANOATE 5 MILLIGRAM(S): 100 INJECTION INTRAMUSCULAR at 11:26

## 2019-09-10 RX ADMIN — LEVETIRACETAM 500 MILLIGRAM(S): 250 TABLET, FILM COATED ORAL at 18:55

## 2019-09-10 RX ADMIN — Medication 650 MILLIGRAM(S): at 00:15

## 2019-09-10 RX ADMIN — Medication 8: at 11:58

## 2019-09-10 RX ADMIN — Medication 650 MILLIGRAM(S): at 07:05

## 2019-09-10 RX ADMIN — Medication 650 MILLIGRAM(S): at 07:45

## 2019-09-10 RX ADMIN — Medication 10 UNIT(S): at 11:59

## 2019-09-10 RX ADMIN — Medication 4: at 17:22

## 2019-09-10 NOTE — PROGRESS NOTE ADULT - PROBLEM SELECTOR PLAN 9
Patient informed that refill was denied. Patient states she will get insulin refilled at PCP because she can not wait until October.
1) PCP Contacted on Admission: (Y/N) --> Name & Phone #:  2) Date of Contact with PCP:  3) PCP Contacted at Discharge: (Y/N)  4) Summary of Handoff Given to PCP:   5) Post-Discharge Appointment Date and Location:
cont. ASA, statin, DOAC

## 2019-09-10 NOTE — PROGRESS NOTE ADULT - REASON FOR ADMISSION
scrotal swelling, edema

## 2019-09-10 NOTE — PROGRESS NOTE ADULT - PROBLEM SELECTOR PLAN 4
Patient reports being on Glargine 22U per nocte and lispro 10U premeal. He reports poor compliance and only using insulin intermittently.    -restarted on home dose 20 Lantus 10 premeal    #CAD  -c/w asa and atorvastatin    #troponemia  downtrended + resolved. pt w/o chest pain or symptoms
cont. basal-bolus insulin, diabetic diet

## 2019-09-10 NOTE — PROGRESS NOTE ADULT - PROBLEM SELECTOR PLAN 3
d/t volume overload, d/t ESRD; testicular U/S unremarkable; sx improved w/ dialysis; cont. elevation
Patient lethargic but arousable and answering questions appropriately. Less likely from uremia as patient is ESRD and BUN is 47. Patient denies alcohol or drug use.  -utox + for cocaine  -monitor mental status for improvement after dialysis    #Seizures  Patient reports history of seizures. Denies any recent seizures.  -c/w keppra 500mg BID

## 2019-09-10 NOTE — PROGRESS NOTE ADULT - SUBJECTIVE AND OBJECTIVE BOX
Patient is a 39y old  Male who presents with a chief complaint of scrotal swelling, edema (10 Sep 2019 12:11)      INTERVAL HPI/OVERNIGHT EVENTS:    Pt. seen and examined this morning during dialysis  Pt. feels well, reports decreased scrotal swelling  Denies F/C, CP, SOB, N/V    Review of Systems: 12 point review of systems otherwise negative    MEDICATIONS  (STANDING):  apixaban 2.5 milliGRAM(s) Oral two times a day  aspirin enteric coated 81 milliGRAM(s) Oral daily  benztropine 0.5 milliGRAM(s) Oral at bedtime  carvedilol 12.5 milliGRAM(s) Oral every 12 hours  dextrose 5%. 1000 milliLiter(s) (50 mL/Hr) IV Continuous <Continuous>  dextrose 50% Injectable 12.5 Gram(s) IV Push once  dextrose 50% Injectable 25 Gram(s) IV Push once  dextrose 50% Injectable 25 Gram(s) IV Push once  furosemide    Tablet 80 milliGRAM(s) Oral two times a day  haloperidol     Tablet 5 milliGRAM(s) Oral daily  influenza   Vaccine 0.5 milliLiter(s) IntraMuscular once  insulin glargine Injectable (LANTUS) 20 Unit(s) SubCutaneous at bedtime  insulin lispro (HumaLOG) corrective regimen sliding scale   SubCutaneous Before meals and at bedtime  insulin lispro Injectable (HumaLOG) 10 Unit(s) SubCutaneous three times a day before meals  levETIRAcetam 500 milliGRAM(s) Oral two times a day  losartan 100 milliGRAM(s) Oral daily  simvastatin 40 milliGRAM(s) Oral at bedtime    MEDICATIONS  (PRN):  ALBUTerol    90 MICROgram(s) HFA Inhaler 2 Puff(s) Inhalation every 6 hours PRN Shortness of Breath and/or Wheezing  dextrose 40% Gel 15 Gram(s) Oral once PRN Blood Glucose LESS THAN 70 milliGRAM(s)/deciliter  glucagon  Injectable 1 milliGRAM(s) IntraMuscular once PRN Glucose LESS THAN 70 milligrams/deciliter      Allergies    Depakote (Nausea)    Intolerances          Vital Signs Last 24 Hrs  T(C): 36.9 (10 Sep 2019 15:35), Max: 36.9 (09 Sep 2019 22:17)  T(F): 98.5 (10 Sep 2019 15:35), Max: 98.5 (09 Sep 2019 22:17)  HR: 81 (10 Sep 2019 15:35) (78 - 85)  BP: 170/89 (10 Sep 2019 15:35) (153/98 - 170/89)  BP(mean): --  RR: 17 (10 Sep 2019 15:35) (12 - 18)  SpO2: 96% (10 Sep 2019 15:35) (94% - 99%)  CAPILLARY BLOOD GLUCOSE      POCT Blood Glucose.: 314 mg/dL (10 Sep 2019 11:53)  POCT Blood Glucose.: 327 mg/dL (10 Sep 2019 06:56)  POCT Blood Glucose.: 276 mg/dL (09 Sep 2019 22:44)  POCT Blood Glucose.: 257 mg/dL (09 Sep 2019 18:33)       @ 07:  -  09-10 @ 07:00  --------------------------------------------------------  IN: 0 mL / OUT: 3900 mL / NET: -3900 mL    09-10 @ 07:  -  09-10 @ 16:29  --------------------------------------------------------  IN: 840 mL / OUT: 2900 mL / NET: -2060 mL        Physical Exam:  (earlier today, during dialysis)  Daily     Daily Weight in k.8 (10 Sep 2019 15:35)  General:  well-appearing in NAD  HEENT: MMM  CV:  RRR, no JVD  Lungs:  CTA B/L, normal WOB on RA  Abdomen:  soft NT ND  Extremities:  2+ edema B/L LE (improved), +AVG  Skin:  WWP  Neuro:  AAOx3    LABS:                        8.6    6.42  )-----------( 216      ( 10 Sep 2019 08:11 )             27.5     0910    134<L>  |  92<L>  |  35<H>  ----------------------------<  378<H>  4.4   |  27  |  5.07<H>    Ca    8.8      10 Sep 2019 08:11  Phos  4.6     09-10  Mg     2.0     09-10    TPro  6.8  /  Alb  3.7  /  TBili  0.8  /  DBili  x   /  AST  16  /  ALT  14  /  AlkPhos  160<H>  0908    PT/INR - ( 09 Sep 2019 05:58 )   PT: 15.7 sec;   INR: 1.38          PTT - ( 09 Sep 2019 05:58 )  PTT:29.9 sec        RADIOLOGY & ADDITIONAL TESTS:    ---------------------------------------------------------------------------  I personally reviewed: [  ]EKG   [  ]CXR    [  ] CT    [  ]Other  ---------------------------------------------------------------------------  PLEASE CHECK WHEN PRESENT:     [  ]Heart Failure     [  ] Acute     [  ] Acute on Chronic     [  ] Chronic  -------------------------------------------------------------------     [  ]Diastolic [HFpEF]     [  ]Systolic [HFrEF]     [  ]Combined [HFpEF & HFrEF]     [  ]Other:  -------------------------------------------------------------------  [  ]SYLVESTER     [  ]ATN     [  ]Reneal Medullary Necrosis     [  ]Renal Cortical Necrosis     [  ]Other Pathological Lesions:    [  ]CKD 1  [  ]CKD 2  [  ]CKD 3  [  ]CKD 4  [  ]CKD 5  [  ]Other  -------------------------------------------------------------------  [  ]Other/Unspecified:    --------------------------------------------------------------------    Abdominal Nutritional Status  [  ]Malnutrition: See Nutrition Note  [  ]Cachexia  [  ]Other:   [  ]Supplement Ordered:  [  ]Morbid Obesity (BMI >=40]

## 2019-09-10 NOTE — DISCHARGE NOTE NURSING/CASE MANAGEMENT/SOCIAL WORK - NSDCFUADDAPPT_GEN_ALL_CORE_FT
[facility] [place]  [address], [place] [address]  Phone: [phone]  Fax: (   )    -  Follow Up Time: Tomorrow at your regular scheduled time

## 2019-09-10 NOTE — PROGRESS NOTE ADULT - SUBJECTIVE AND OBJECTIVE BOX
O/N Events:  ARUN/ remained HDS and Afeb  Subjective:  tolerated HD well 4L UF removed with remarkable improvement in LE and scrotal edema weight down from 100.6 to 96.7 kg  bp 150/70 mmhg/ K 4.4/BUN 35/ non uremic on exam     VITALS  Vital Signs Last 24 Hrs  T(C): 36.5 (10 Sep 2019 10:00), Max: 37 (09 Sep 2019 13:30)  T(F): 97.7 (10 Sep 2019 10:00), Max: 98.6 (09 Sep 2019 13:30)  HR: 81 (10 Sep 2019 10:00) (74 - 84)  BP: 155/60 (10 Sep 2019 10:00) (151/89 - 162/83)  RR: 16 (10 Sep 2019 10:00) (12 - 18)  SpO2: 96% (10 Sep 2019 10:00) (94% - 99%)    PHYSICAL EXAM  Constitutional: NAD/ sitting at the edge of the bed/ symptoms improved   Respiratory: CTA B/L  Cardiac: +S1/S2; RRR; no M/R/G  Gastrointestinal: soft, NT/ND  Genitourinary: mild scrotal swelling present  Extremities: WWP, 2+ LE pitting edema present however much improved   Neurologic: AAOx3; left sided hemiparesis and left facial droop present/ right side forces 5/5  Psychiatric: pleasant and cooperative  Access: AVG c/d/i with bruit and palpable thrill    LABS                        8.6    6.42  )-----------( 216      ( 10 Sep 2019 08:11 )             27.5     09-10    134<L>  |  92<L>  |  35<H>  ----------------------------<  378<H>  4.4   |  27  |  5.07<H>    Ca    8.8      10 Sep 2019 08:11  Phos  4.6     09-10  Mg     2.0     09-10    TPro  6.8  /  Alb  3.7  /  TBili  0.8  /  DBili  x   /  AST  16  /  ALT  14  /  AlkPhos  160<H>  09-08    LIVER FUNCTIONS - ( 08 Sep 2019 23:29 )  Alb: 3.7 g/dL / Pro: 6.8 g/dL / ALK PHOS: 160 U/L / ALT: 14 U/L / AST: 16 U/L / GGT: x           PT/INR - ( 09 Sep 2019 05:58 )   PT: 15.7 sec;   INR: 1.38          PTT - ( 09 Sep 2019 05:58 )  PTT:29.9 sec    CARDIAC MARKERS ( 09 Sep 2019 13:11 )  x     / 0.42 ng/mL / x     / x     / x      CARDIAC MARKERS ( 09 Sep 2019 05:58 )  x     / 0.43 ng/mL / x     / x     / x      CARDIAC MARKERS ( 08 Sep 2019 23:29 )  x     / 0.39 ng/mL / x     / x     / x

## 2019-09-10 NOTE — PROGRESS NOTE ADULT - SUBJECTIVE AND OBJECTIVE BOX
OVERNIGHT EVENTS:    SUBJECTIVE / INTERVAL HPI: Pt feels well this afternoon s/p dialysis. improvement in breathing, leg edema, and penis/scrotal edema. Says he will f/u w/ his dialysis session tomorrow. Denies chest pain, sob, palpitations, orthopnea    VITAL SIGNS:  Vital Signs Last 24 Hrs  T(C): 36.9 (10 Sep 2019 15:35), Max: 36.9 (09 Sep 2019 22:17)  T(F): 98.5 (10 Sep 2019 15:35), Max: 98.5 (09 Sep 2019 22:17)  HR: 81 (10 Sep 2019 15:35) (81 - 85)  BP: 170/89 (10 Sep 2019 15:35) (153/98 - 170/89)  BP(mean): --  RR: 17 (10 Sep 2019 15:35) (12 - 18)  SpO2: 96% (10 Sep 2019 15:35) (94% - 99%)    PHYSICAL EXAM:    General: Obese, NAD, on room air  HEENT: NC/AT; PERRL, anicteric sclera; MMM  Neck: supple, no JVD  Cardiovascular: +S1/S2; RRR  Respiratory: Bibasilar lung crackles, no wheezing  Gastrointestinal: firm, bsx4, nontender to palpation  Extremities: +1 pitting leg edema  Vascular: 2+ radial, DP/PT pulses B/L  Neurological: AAOx3; no focal deficits    MEDICATIONS:  MEDICATIONS  (STANDING):  apixaban 2.5 milliGRAM(s) Oral two times a day  aspirin enteric coated 81 milliGRAM(s) Oral daily  benztropine 0.5 milliGRAM(s) Oral at bedtime  carvedilol 12.5 milliGRAM(s) Oral every 12 hours  dextrose 5%. 1000 milliLiter(s) (50 mL/Hr) IV Continuous <Continuous>  dextrose 50% Injectable 12.5 Gram(s) IV Push once  dextrose 50% Injectable 25 Gram(s) IV Push once  dextrose 50% Injectable 25 Gram(s) IV Push once  furosemide    Tablet 80 milliGRAM(s) Oral two times a day  haloperidol     Tablet 5 milliGRAM(s) Oral daily  influenza   Vaccine 0.5 milliLiter(s) IntraMuscular once  insulin glargine Injectable (LANTUS) 20 Unit(s) SubCutaneous at bedtime  insulin lispro (HumaLOG) corrective regimen sliding scale   SubCutaneous Before meals and at bedtime  insulin lispro Injectable (HumaLOG) 10 Unit(s) SubCutaneous three times a day before meals  levETIRAcetam 500 milliGRAM(s) Oral two times a day  losartan 100 milliGRAM(s) Oral daily  simvastatin 40 milliGRAM(s) Oral at bedtime    MEDICATIONS  (PRN):  ALBUTerol    90 MICROgram(s) HFA Inhaler 2 Puff(s) Inhalation every 6 hours PRN Shortness of Breath and/or Wheezing  dextrose 40% Gel 15 Gram(s) Oral once PRN Blood Glucose LESS THAN 70 milliGRAM(s)/deciliter  glucagon  Injectable 1 milliGRAM(s) IntraMuscular once PRN Glucose LESS THAN 70 milligrams/deciliter      ALLERGIES:  Allergies    Depakote (Nausea)    Intolerances        LABS:                        8.6    6.42  )-----------( 216      ( 10 Sep 2019 08:11 )             27.5     09-10    134<L>  |  92<L>  |  35<H>  ----------------------------<  378<H>  4.4   |  27  |  5.07<H>    Ca    8.8      10 Sep 2019 08:11  Phos  4.6     09-10  Mg     2.0     09-10    TPro  6.8  /  Alb  3.7  /  TBili  0.8  /  DBili  x   /  AST  16  /  ALT  14  /  AlkPhos  160<H>  09-08    PT/INR - ( 09 Sep 2019 05:58 )   PT: 15.7 sec;   INR: 1.38          PTT - ( 09 Sep 2019 05:58 )  PTT:29.9 sec    CAPILLARY BLOOD GLUCOSE      POCT Blood Glucose.: 204 mg/dL (10 Sep 2019 16:48)      RADIOLOGY & ADDITIONAL TESTS: Reviewed.    ASSESSMENT:    PLAN:

## 2019-09-10 NOTE — PROGRESS NOTE ADULT - PROBLEM SELECTOR PLAN 5
Patient w/ ESRD, Dialysis MWF.  -received 2 sessions, will f/u outpt tomorrow at regular sessions    #HLD  -c/w atorvastatin
cont. ASA, statin, beta-blocker

## 2019-09-10 NOTE — PROGRESS NOTE ADULT - PROBLEM SELECTOR PROBLEM 5
ESRD (end stage renal disease)
Coronary artery disease involving native heart without angina pectoris, unspecified vessel or lesion type

## 2019-09-10 NOTE — PROGRESS NOTE ADULT - SUBJECTIVE AND OBJECTIVE BOX
Patient was seen and evaluated on dialysis.   HR: 81 (09-10-19 @ 10:00)  BP: 155/60 (09-10-19 @ 10:00)  Wt(kg): 96.7  09-10    134<L>  |  92<L>  |  35<H>  ----------------------------<  378<H>  4.4   |  27  |  5.07<H>    Ca    8.8      10 Sep 2019 08:11  Phos  4.6     09-10  Mg     2.0     09-10    TPro  6.8  /  Alb  3.7  /  TBili  0.8  /  DBili  x   /  AST  16  /  ALT  14  /  AlkPhos  160<H>  09-08    Continue dialysis:   Dialyzer:   Optiflux 180  QB: 400  K bath: 2  Goal UF: 2  Duration: 180

## 2019-09-10 NOTE — PROGRESS NOTE ADULT - PROBLEM SELECTOR PROBLEM 4
Type 2 diabetes mellitus with hyperglycemia, with long-term current use of insulin
DM (diabetes mellitus)

## 2019-09-10 NOTE — PROGRESS NOTE ADULT - PROBLEM SELECTOR PLAN 1
on HD via AVG/ follows at [facility]  s/p HD yesterday with sig improvement in edema   - will plan for additional shorter session of HD today as he is still overloaded  should continue with his reg HD sche tomorrow     Will follow  discussed with Dr. Dunbar.
d/t ESRD; volume status improved w/ dialysis, Lasix
Patient w/ diffuse fluid overload, 2+ edema of b/l LE to thighs, scrotal/penile swelling, abdominal distension. Reports last dialysis was on Friday 9/6 but states he missed one recently but cannot report exactly when.   -utox + cocaine - most likely caused of overload vs missed dialysis   -trop downtrending  -Had 2nd session of dialysis today (during admission), scheduled for outpt tomorrow at regular sessions

## 2019-09-10 NOTE — PROGRESS NOTE ADULT - PROBLEM SELECTOR PLAN 2
cont. HD per Renal, monitor BMP, renally-dose rx
Patient with scrotal and penile pain, likely 2/2 edema from fluid overload as above.  -treatment of fluid overload as above

## 2019-09-10 NOTE — DISCHARGE NOTE NURSING/CASE MANAGEMENT/SOCIAL WORK - PATIENT PORTAL LINK FT
You can access the FollowMyHealth Patient Portal offered by [facility] by registering at the following website: [email or web address]. By joining [employer]’s FollowMyHealth portal, you will also be able to view your health information using other applications (apps) compatible with our system.

## 2019-09-10 NOTE — PROGRESS NOTE ADULT - PROBLEM SELECTOR PLAN 6
Patient w/ history of CVA w/ left sided weakness and left facial droop.   -c/w aspirin and statin    #Anemia  Hgb of 8.0, no signs of active bleeding, denies hematuria, melena and BRBPR. Likely 2/2 ESRD.  - trend CBC  - maintain active T&S  - transfuse for Hgb <7
likely d/t ESRD, in setting of CAD; Pt. denies CP and EKG non-ischemic; TTE w/out wall motion abnormalities; cont. rx as above, no need for further inpatient work-up

## 2019-09-10 NOTE — PROGRESS NOTE ADULT - ASSESSMENT
40 y/o M w/
A/p  40 y/o AAM w/PMHx of chronic vasculopathy likely in the setting prolonged uncontrolled DM and HTN cb CAD/ CVA/ ESRD on HD presenting to ED for worsening of scrotal pain and swelling over the weekend. nephrology consulted to set up for HD.
Patient is a 40 yo M, poor historian, w/ PMHx of HTN, HLD, CAD s/p stents, ESRD (M,W,F), history of CVA (w/ left facial droop, left sided weakness), seizures and schizophrenia who presents with scrotal pain and swelling, likely fluid overload in setting of ESRD.  Now s/p HD x2

## 2019-09-11 VITALS
RESPIRATION RATE: 16 BRPM | OXYGEN SATURATION: 95 % | TEMPERATURE: 98 F | DIASTOLIC BLOOD PRESSURE: 90 MMHG | SYSTOLIC BLOOD PRESSURE: 162 MMHG | HEART RATE: 87 BPM

## 2019-09-11 LAB — GLUCOSE BLDC GLUCOMTR-MCNC: 186 MG/DL — HIGH (ref 70–99)

## 2019-09-11 PROCEDURE — 84484 ASSAY OF TROPONIN QUANT: CPT

## 2019-09-11 PROCEDURE — 83735 ASSAY OF MAGNESIUM: CPT

## 2019-09-11 PROCEDURE — 84100 ASSAY OF PHOSPHORUS: CPT

## 2019-09-11 PROCEDURE — 80053 COMPREHEN METABOLIC PANEL: CPT

## 2019-09-11 PROCEDURE — 76870 US EXAM SCROTUM: CPT

## 2019-09-11 PROCEDURE — 71045 X-RAY EXAM CHEST 1 VIEW: CPT

## 2019-09-11 PROCEDURE — 86706 HEP B SURFACE ANTIBODY: CPT

## 2019-09-11 PROCEDURE — 80048 BASIC METABOLIC PNL TOTAL CA: CPT

## 2019-09-11 PROCEDURE — 99285 EMERGENCY DEPT VISIT HI MDM: CPT | Mod: 25

## 2019-09-11 PROCEDURE — 85610 PROTHROMBIN TIME: CPT

## 2019-09-11 PROCEDURE — 93005 ELECTROCARDIOGRAM TRACING: CPT

## 2019-09-11 PROCEDURE — 96375 TX/PRO/DX INJ NEW DRUG ADDON: CPT

## 2019-09-11 PROCEDURE — 80307 DRUG TEST PRSMV CHEM ANLYZR: CPT

## 2019-09-11 PROCEDURE — 86803 HEPATITIS C AB TEST: CPT

## 2019-09-11 PROCEDURE — 85025 COMPLETE CBC W/AUTO DIFF WBC: CPT

## 2019-09-11 PROCEDURE — 85730 THROMBOPLASTIN TIME PARTIAL: CPT

## 2019-09-11 PROCEDURE — 93306 TTE W/DOPPLER COMPLETE: CPT

## 2019-09-11 PROCEDURE — 36415 COLL VENOUS BLD VENIPUNCTURE: CPT

## 2019-09-11 PROCEDURE — 83036 HEMOGLOBIN GLYCOSYLATED A1C: CPT

## 2019-09-11 PROCEDURE — 85027 COMPLETE CBC AUTOMATED: CPT

## 2019-09-11 PROCEDURE — 90935 HEMODIALYSIS ONE EVALUATION: CPT

## 2019-09-11 PROCEDURE — 82962 GLUCOSE BLOOD TEST: CPT

## 2019-09-11 PROCEDURE — 96374 THER/PROPH/DIAG INJ IV PUSH: CPT

## 2019-09-11 RX ADMIN — Medication 2: at 07:43

## 2019-09-11 RX ADMIN — CARVEDILOL PHOSPHATE 12.5 MILLIGRAM(S): 80 CAPSULE, EXTENDED RELEASE ORAL at 06:24

## 2019-09-11 RX ADMIN — Medication 80 MILLIGRAM(S): at 06:24

## 2019-09-11 RX ADMIN — Medication 81 MILLIGRAM(S): at 11:19

## 2019-09-11 RX ADMIN — Medication 10 UNIT(S): at 07:43

## 2019-09-11 RX ADMIN — LOSARTAN POTASSIUM 100 MILLIGRAM(S): 100 TABLET, FILM COATED ORAL at 06:24

## 2019-09-11 RX ADMIN — APIXABAN 2.5 MILLIGRAM(S): 2.5 TABLET, FILM COATED ORAL at 11:19

## 2019-09-11 RX ADMIN — LEVETIRACETAM 500 MILLIGRAM(S): 250 TABLET, FILM COATED ORAL at 06:24

## 2019-09-11 RX ADMIN — HALOPERIDOL DECANOATE 5 MILLIGRAM(S): 100 INJECTION INTRAMUSCULAR at 11:19

## 2019-09-13 DIAGNOSIS — D63.1 ANEMIA IN CHRONIC KIDNEY DISEASE: ICD-10-CM

## 2019-09-13 DIAGNOSIS — Z95.5 PRESENCE OF CORONARY ANGIOPLASTY IMPLANT AND GRAFT: ICD-10-CM

## 2019-09-13 DIAGNOSIS — N50.89 OTHER SPECIFIED DISORDERS OF THE MALE GENITAL ORGANS: ICD-10-CM

## 2019-09-13 DIAGNOSIS — E87.70 FLUID OVERLOAD, UNSPECIFIED: ICD-10-CM

## 2019-09-13 DIAGNOSIS — F20.9 SCHIZOPHRENIA, UNSPECIFIED: ICD-10-CM

## 2019-09-13 DIAGNOSIS — G92 TOXIC ENCEPHALOPATHY: ICD-10-CM

## 2019-09-13 DIAGNOSIS — I25.10 ATHEROSCLEROTIC HEART DISEASE OF NATIVE CORONARY ARTERY WITHOUT ANGINA PECTORIS: ICD-10-CM

## 2019-09-13 DIAGNOSIS — I12.0 HYPERTENSIVE CHRONIC KIDNEY DISEASE WITH STAGE 5 CHRONIC KIDNEY DISEASE OR END STAGE RENAL DISEASE: ICD-10-CM

## 2019-09-13 DIAGNOSIS — E78.5 HYPERLIPIDEMIA, UNSPECIFIED: ICD-10-CM

## 2019-09-13 DIAGNOSIS — I69.354 HEMIPLEGIA AND HEMIPARESIS FOLLOWING CEREBRAL INFARCTION AFFECTING LEFT NON-DOMINANT SIDE: ICD-10-CM

## 2019-09-13 DIAGNOSIS — E11.22 TYPE 2 DIABETES MELLITUS WITH DIABETIC CHRONIC KIDNEY DISEASE: ICD-10-CM

## 2019-09-13 DIAGNOSIS — F14.10 COCAINE ABUSE, UNCOMPLICATED: ICD-10-CM

## 2019-09-13 DIAGNOSIS — N18.6 END STAGE RENAL DISEASE: ICD-10-CM

## 2020-11-17 NOTE — PROVIDER CONTACT NOTE (CRITICAL VALUE NOTIFICATION) - NAME OF MD/NP/PA/DO NOTIFIED:
julieth benson and md olivo
shyann COOK
Follow up with your primary care doctor in 1-2 days    You are being discharged with viral illness diagnosis and do not require hospitalization.  At this time, only patients who are being hospitalized are tested for COVID-19.    If you are well enough to be discharged home and are not in a high risk group to be admitted, you should care for yourself at home exactly like you would if you have Influenza “flu”. Follow all the standard guidelines about washing your hands, covering your cough, etc. If you feel unwell, stay home, rest and drink plenty of clear fluids. Keep track of your symptoms.   You do need to remain home for at least 7 days from the onset of symptoms or 3 days after your fever is completely gone and your respiratory symptoms are better, whichever is longer. You should continue to isolate yourself.    If symptoms worsen or continue and you need to seek medical care, call your healthcare provider in advance, or 9-1-1 in an emergency, and let them know you are a close contact to a person with confirmed COVID-19  You should return to the Emergency Department if you develop worse symptoms, trouble breathing, chest pain, and/or a fever that doesn’t improve with over the counter medications.    Please consider going through the drive-through testing unless you are severely ill and need to go to the ED.  -through testing is available at various location, including [place].  Call [facility] at  [phone] to make an appointment.    How to Set Up Your Home for Self-Quarantine or Self-Isolation    Please refer to this helpful video.   [email or web address]

## 2021-03-16 NOTE — H&P ADULT - DOES THIS PATIENT HAVE A HISTORY OF OR HAS BEEN DX WITH HEART FAILURE?
Lv Mcintyre is a 38 year old male presenting with left ear concerns.   Denies known Latex allergy or symptoms of Latex sensitivity.  Medications verified, no changes  Patient would like communication of their results via:          Cell Phone:   Telephone Information:   Mobile [phone]     Okay to leave a message containing results? Yes     no

## 2023-06-20 NOTE — ED ADULT NURSE NOTE - NS ED NOTE  TALK SOMEONE YN
----- Message from ROMEL Lemus sent at 6/20/2023  6:27 AM CDT -----  Call patient tell labs normal/stable vitamin-D normal.  Labs look stable see how doing follow-up as planned.  We could get Joleen REYNOSO nurse resource to contact patient and see what we can do for her.  Please put in referral to Tatyana Eagle senior resource nurse to see if she can help her out.   No

## 2023-10-13 NOTE — PATIENT PROFILE ADULT - [facility]
Care Management Discharge Note    Discharge Date: 10/13/2023       Discharge Disposition: Assisted Living    Discharge Services:      Discharge DME:      Discharge Transportation: family or friend will provide, agency    Private pay costs discussed: Not applicable    Does the patient's insurance plan have a 3 day qualifying hospital stay waiver?  No    PAS Confirmation Code:    Patient/family educated on Medicare website which has current facility and service quality ratings:      Education Provided on the Discharge Plan:    Persons Notified of Discharge Plans: Pt/bedside RN/MD  Patient/Family in Agreement with the Plan: yes    Handoff Referral Completed: No    Additional Information:  Pt discharge back to [facility].  Nurse Tram at [facility] updated of discharge.   Orders faxed to [phone].  [facility] to make packet.   Updated [facility] of discharge. Orders sent via [employer]. Added to [facility] to coordinate Follow-up.   Bedside RN to review [facility].  Family to transport     Felicia Ortega RN      (3) slightly limited

## 2024-05-15 NOTE — CONSULT NOTE ADULT - CONSULT REQUESTED BY NAME
ENDOSCOPY DISCHARGE INSTRUCTIONS:    Call the physician that did your procedure for any questions or concerns:           DR. SWANSON:  [phone]      ACTIVITY:    There are potential side effects from the medications used for sedation and anesthesia during your procedure.  These include:  Dizziness or light-headedness, confusion or memory loss, delayed reaction times, loss of coordination, nausea and vomiting.  Because of your increased risk for injury, we ask that you observe the following precautions:  For the next 24 hours,  DO NOT operate an automobile, bicycle, motorcycle, , power tools or large equipment of any kind.  Do not drink alcohol, sign any legal documents or make any legal decisions for 24 hours.  Do not bend your head over lower than your heart.  DO sit on the side of bed/couch awhile before getting up.  Plan on bedrest or quiet relaxation today.  You may resume normal activities in 24 hours.                  DIET:    Your first meal today should be light, avoiding spicy and fatty foods.  If you tolerate this first meal, then you may advance to your regular diet unless otherwise advised by your physician.      NORMAL SYMPTOMS:  Mild sore throat if you had an EGD  Gaseous discomfort: belching or flatus        NOTIFY YOUR PHYSICIAN IF THESE SYMPTOMS OCCUR:  1. Fever (greater than 100)  5. Increased abdominal bloating  2. Severe pain    6. Excessive bleeding  3. Nausea and vomiting  7. Chest pain                                                                    4. Chills    8. Shortness of breath      ADDITIONAL INSTRUCTIONS:    Biopsy results: Office will notify you by mail with biopsy results. If you haven't heard from him in 1 week, please call his office.    Educational Information:     Follow up appointment:                                Please review these discharge instructions this evening or tomorrow for   information you may have forgotten.            We want to thank you for  ED staff

## 2024-12-07 NOTE — PROGRESS NOTE ADULT - PROVIDER SPECIALTY LIST ADULT
Continue Tylenol, Motrin as needed for discomfort.  Continue other medications as previously prescribed and follow-up with cardiology as discussed in the ER.  Return to the ER for any new or worsening symptoms.  
Hospitalist
Nephrology
Internal Medicine

## 2025-07-02 NOTE — PATIENT PROFILE ADULT - ANY SIGNIFICANT CHANGE IN ABILITY TO PERFORM THE FOLLOWING ADL SINCE THE ONSET OF PRESENT ILLNESS?
Rx Refill Request Telephone Encounter    Name:  Rahul QUEVEDO Precious IVEY  :  [id number]    Specific Pharmacy location:  [facility] in [place]   Date of last appointment:  3/18/25  Date of next appointment:  N/A            
no

## 2025-07-10 NOTE — ED PROVIDER NOTE - CHPI ED SYMPTOMS NEG
Caller: Teena Jacobson    Relationship: Self    Best call back number: [phone]     What was the call regarding: PATIENT STATES THAT HER PHYSICAL WAS BILLED AS A PHYSICAL AND OTHER STUFF. IT'S NOW SAYING SHE OWES A COPAY FOR THAT DAY AS WELL.    Is it okay if the provider responds through MyChart: NO   no fever